# Patient Record
Sex: MALE | Race: BLACK OR AFRICAN AMERICAN | Employment: UNEMPLOYED | URBAN - METROPOLITAN AREA
[De-identification: names, ages, dates, MRNs, and addresses within clinical notes are randomized per-mention and may not be internally consistent; named-entity substitution may affect disease eponyms.]

---

## 2017-02-09 ENCOUNTER — HOSPITAL ENCOUNTER (OUTPATIENT)
Dept: NEUROLOGY | Age: 47
Discharge: HOME | End: 2017-02-09
Attending: PSYCHIATRY & NEUROLOGY | Admitting: PSYCHIATRY & NEUROLOGY

## 2017-02-09 DIAGNOSIS — G40.411 OTHER GENERALIZED EPILEPSY AND EPILEPTIC SYNDROMES, INTRACTABLE, WITH STATUS EPILEPTICUS (HCC): ICD-10-CM

## 2023-11-27 RX ORDER — ATORVASTATIN CALCIUM 10 MG/1
10 TABLET, FILM COATED ORAL DAILY
COMMUNITY

## 2023-11-27 RX ORDER — ABACAVIR SULFATE, DOLUTEGRAVIR SODIUM, LAMIVUDINE 600; 50; 300 MG/1; MG/1; MG/1
TABLET, FILM COATED ORAL
COMMUNITY

## 2023-11-27 RX ORDER — OXCARBAZEPINE 300 MG/1
300 TABLET, FILM COATED ORAL 2 TIMES DAILY
COMMUNITY

## 2023-11-27 RX ORDER — METOPROLOL SUCCINATE 25 MG/1
25 TABLET, EXTENDED RELEASE ORAL DAILY
COMMUNITY

## 2023-11-29 ENCOUNTER — ANESTHESIA EVENT (OUTPATIENT)
Dept: ENDOSCOPY | Age: 53
End: 2023-11-29
Payer: COMMERCIAL

## 2023-11-29 NOTE — PROGRESS NOTES
Spoke with patient and he will arrive at [facility] at [facility] on 11/30/2023 for his procedure at [address]. IV order is in [facility].

## 2023-11-30 ENCOUNTER — ANESTHESIA (OUTPATIENT)
Dept: ENDOSCOPY | Age: 53
End: 2023-11-30
Payer: COMMERCIAL

## 2023-11-30 ENCOUNTER — HOSPITAL ENCOUNTER (OUTPATIENT)
Age: 53
Setting detail: OUTPATIENT SURGERY
Discharge: HOME | End: 2023-11-30
Attending: INTERNAL MEDICINE | Admitting: INTERNAL MEDICINE
Payer: COMMERCIAL

## 2023-11-30 VITALS
TEMPERATURE: 97.8 F | WEIGHT: 150 LBS | BODY MASS INDEX: 25.61 KG/M2 | DIASTOLIC BLOOD PRESSURE: 98 MMHG | OXYGEN SATURATION: 100 % | RESPIRATION RATE: 18 BRPM | HEIGHT: 64 IN | HEART RATE: 68 BPM | SYSTOLIC BLOOD PRESSURE: 155 MMHG

## 2023-11-30 DIAGNOSIS — B20 HIV INFECTION, UNSPECIFIED SYMPTOM STATUS (HCC): ICD-10-CM

## 2023-11-30 DIAGNOSIS — Z86.010 PERSONAL HISTORY OF COLONIC POLYPS: ICD-10-CM

## 2023-11-30 PROCEDURE — 7100000011 HC PHASE II RECOVERY - ADDTL 15 MIN: Performed by: INTERNAL MEDICINE

## 2023-11-30 PROCEDURE — 3700000000 HC ANESTHESIA ATTENDED CARE: Performed by: INTERNAL MEDICINE

## 2023-11-30 PROCEDURE — 88305 TISSUE EXAM BY PATHOLOGIST: CPT | Performed by: PATHOLOGY

## 2023-11-30 PROCEDURE — 6360000002 HC RX W HCPCS: Performed by: NURSE ANESTHETIST, CERTIFIED REGISTERED

## 2023-11-30 PROCEDURE — 7100000010 HC PHASE II RECOVERY - FIRST 15 MIN: Performed by: INTERNAL MEDICINE

## 2023-11-30 PROCEDURE — 2709999900 HC NON-CHARGEABLE SUPPLY: Performed by: INTERNAL MEDICINE

## 2023-11-30 PROCEDURE — 3700000001 HC ADD 15 MINUTES (ANESTHESIA): Performed by: INTERNAL MEDICINE

## 2023-11-30 PROCEDURE — 2500000003 HC RX 250 WO HCPCS: Performed by: NURSE ANESTHETIST, CERTIFIED REGISTERED

## 2023-11-30 PROCEDURE — 3609010600 HC COLONOSCOPY POLYPECTOMY SNARE/COLD BIOPSY: Performed by: INTERNAL MEDICINE

## 2023-11-30 PROCEDURE — 2580000003 HC RX 258: Performed by: ANESTHESIOLOGY

## 2023-11-30 RX ORDER — SODIUM CHLORIDE, SODIUM LACTATE, POTASSIUM CHLORIDE, CALCIUM CHLORIDE 600; 310; 30; 20 MG/100ML; MG/100ML; MG/100ML; MG/100ML
INJECTION, SOLUTION INTRAVENOUS CONTINUOUS
Status: DISCONTINUED | OUTPATIENT
Start: 2023-11-30 | End: 2023-11-30 | Stop reason: HOSPADM

## 2023-11-30 RX ORDER — PROPOFOL 10 MG/ML
INJECTION, EMULSION INTRAVENOUS PRN
Status: DISCONTINUED | OUTPATIENT
Start: 2023-11-30 | End: 2023-11-30 | Stop reason: SDUPTHER

## 2023-11-30 RX ORDER — LIDOCAINE HYDROCHLORIDE 20 MG/ML
INJECTION, SOLUTION INFILTRATION; PERINEURAL PRN
Status: DISCONTINUED | OUTPATIENT
Start: 2023-11-30 | End: 2023-11-30 | Stop reason: SDUPTHER

## 2023-11-30 RX ADMIN — LIDOCAINE HYDROCHLORIDE 100 MG: 20 INJECTION, SOLUTION INFILTRATION; PERINEURAL at 08:42

## 2023-11-30 RX ADMIN — PROPOFOL 280 MG: 10 INJECTION, EMULSION INTRAVENOUS at 08:42

## 2023-11-30 RX ADMIN — SODIUM CHLORIDE, POTASSIUM CHLORIDE, SODIUM LACTATE AND CALCIUM CHLORIDE: 600; 310; 30; 20 INJECTION, SOLUTION INTRAVENOUS at 07:51

## 2023-11-30 ASSESSMENT — PAIN - FUNCTIONAL ASSESSMENT: PAIN_FUNCTIONAL_ASSESSMENT: 0-10

## 2023-11-30 ASSESSMENT — PAIN SCALES - GENERAL
PAINLEVEL_OUTOF10: 0
PAINLEVEL_OUTOF10: 0

## 2023-11-30 NOTE — H&P
[facility]  Pre-operative History and Physical    Patient: Rayne Levi  : 1970  Acct#:       ASSESSMENT AND PLAN:    1. Patient is a 46 y.o. male w/ h/o HIV here for procedure: with deep sedation  - Colonoscopy: h/o polyps, surveillance    2. Procedure options, risks and benefits reviewed with patient. Patient expresses understanding. Joe Gaines MD  [facility]    HISTORY OF PRESENT ILLNESS:    The patient is a 46 y.o. male with significant past medical history as below who presents for procedure. Indication: same as above    History Obtained From:  Patient and review of all records    Past Medical History:        Diagnosis Date    CKD (chronic kidney disease) stage 3, GFR 30-59 ml/min ([facility])     History of headache     HIV disease ([facility])     Hyperlipidemia     Hypertension     Seizure disorder ([facility])        Past Surgical History:        Procedure Laterality Date    COLONOSCOPY           Current Medications:    Medications    Prior to Admission medications    Medication Sig Start Date End Date Taking? Authorizing Provider   abacavir-dolutegravir-lamivudine (TRIUMEQ) 600- MG TABS Take by mouth   Yes Bao Christina MD   Ferrous Sulfate (FEROSUL PO) Take by mouth   Yes Bao Christina MD   OXcarbazepine (TRILEPTAL) 300 MG tablet Take 1 tablet by mouth 2 times daily   Yes Bao Christina MD   atorvastatin (LIPITOR) 10 MG tablet Take 1 tablet by mouth daily   Yes Bao Christina MD   metoprolol succinate (TOPROL XL) 25 MG extended release tablet Take 1 tablet by mouth daily   Yes Bao Christina MD   albuterol sulfate HFA (PROVENTIL HFA) 108 (90 Base) MCG/ACT inhaler Inhale 2 puffs into the lungs every 4 hours as needed for Wheezing or Shortness of Breath With spacer (and mask if indicated). Thanks.  3/13/18 4/12/18  Randy Perea MD   amitriptyline (ELAVIL) 50 MG tablet Take 1 tablet by mouth nightly 7/10/16   Cristina Parra MD   oxyCODONE HCl (OXY-IR)

## 2023-11-30 NOTE — PROGRESS NOTES
1870 - patient received from endo, report received from [facility], Pt A&O, denies pain or nausea. Patient given juice. Call light within reach. Pat abd soft and non-tender. 56 - Family at bedside, discharge instructions given to patient and wife, understanding voiced without any further questions at this time.   3951 - patient dressing  2150 - patient left sds via wheelchair accompanied by [facility] personnel

## 2023-11-30 NOTE — BRIEF OP NOTE
[facility]    Procedure Note    2023  9:07 AM    Patient:    Yari Calles  : 1970   46 y. o. MRN: [id number]  Admitted: 2023  6:51 AM ATT: Ana Jaquez MD   ENDO/NONE  AdmitDx: Personal history of colonic polyps [Z86.010]  HIV infection, unspecified symptom status ([address]) [B20]  PCP: Jess Rae MD    Procedure:     Colonoscopy polypectomy    Date:  2023     Surgeon:  Ana Jaquez MD     Referring Physician:  ATT: Ana Jaquez MD, PCP: Jess Rae MD    Preoperative Diagnosis:  Screening CRC - high risk w/ h/o polyps    Postoperative Diagnosis:  Ascending colon polyp    Anesthesia:  MAC Sedation (Deep Anesthesia)    Indications: This is a 46y.o. year old male who presents today with indications as above. The patient tolerated the procedure well and was taken to the post anesthesia care unit in good condition. Complications: None  Estimated Blood Loss: <5cc  Specimens: biopsies were obtained  _______________________________________________________________________________  Colonoscopy: 23    Impression:          -  One 5 mm polyp in the ascending colon, removed with a cold snare. Resected             and retrieved. -  Diverticulosis in the sigmoid colon. -  Internal hemorrhoids.          -  Anal papilla(e) were hypertrophied. Recommendation:          -  Await pathology results. -  Repeat colonoscopy in 5 years for surveillance based on pathology results. -  Return to GI clinic in 2 weeks. -  The findings and recommendations were discussed with the patient and their             family.      _______________________________________________________________________________    [facility] - [place]

## 2023-11-30 NOTE — ANESTHESIA POSTPROCEDURE EVALUATION
Department of Anesthesiology  Postprocedure Note    Patient: Cisco Raines  MRN: [id number]  YOB: 1970  Date of evaluation: 11/30/2023      Procedure Summary     Date: 11/30/23 Room / Location: [address] [place] [address]    Anesthesia Start: 0825 Anesthesia Stop: 7539    Procedure: COLONOSCOPY POLYPECTOMY SNARE/COLD BIOPSY Diagnosis:       Personal history of colonic polyps      HIV infection, unspecified symptom status ([address])      (Personal history of colonic polyps [Z86.010])      (HIV infection, unspecified symptom status ([address]) [B20])    Surgeons: Maral Price MD Responsible Provider: Yvan Perales MD    Anesthesia Type: MAC ASA Status: 3          Anesthesia Type: No value filed.     Carmelita Phase I: Carmelita Score: 10    Carmelita Phase II:        Anesthesia Post Evaluation    Patient location during evaluation: bedside  Patient participation: complete - patient participated  Level of consciousness: awake and alert  Pain score: 0  Airway patency: patent  Nausea & Vomiting: no vomiting and no nausea  Complications: no  Cardiovascular status: blood pressure returned to baseline and hemodynamically stable  Respiratory status: acceptable, room air, spontaneous ventilation and nonlabored ventilation  Hydration status: stable  Pain management: adequate

## (undated) DEVICE — ENDOSCOPY KIT: Brand: MEDLINE INDUSTRIES, INC.

## (undated) DEVICE — SNARE VASC L240CM LOOP W10MM SHTH DIA2.4MM RND STIFF CLD